# Patient Record
Sex: FEMALE | Race: WHITE | URBAN - METROPOLITAN AREA
[De-identification: names, ages, dates, MRNs, and addresses within clinical notes are randomized per-mention and may not be internally consistent; named-entity substitution may affect disease eponyms.]

---

## 2020-01-10 ENCOUNTER — NEW PATIENT (OUTPATIENT)
Dept: URBAN - METROPOLITAN AREA CLINIC 85 | Facility: CLINIC | Age: 80
End: 2020-01-10
Payer: MEDICARE

## 2020-01-10 DIAGNOSIS — H52.4 PRESBYOPIA: ICD-10-CM

## 2020-01-10 PROCEDURE — 92015 DETERMINE REFRACTIVE STATE: CPT | Performed by: OPTOMETRIST

## 2020-01-10 PROCEDURE — 92004 COMPRE OPH EXAM NEW PT 1/>: CPT | Performed by: OPTOMETRIST

## 2020-01-10 PROCEDURE — 92134 CPTRZ OPH DX IMG PST SGM RTA: CPT | Performed by: OPTOMETRIST

## 2020-01-10 ASSESSMENT — INTRAOCULAR PRESSURE
OS: 14
OD: 14

## 2020-01-10 ASSESSMENT — VISUAL ACUITY
OD: 20/50
OS: 20/30

## 2020-08-01 ENCOUNTER — NEW PATIENT (OUTPATIENT)
Dept: URBAN - METROPOLITAN AREA CLINIC 85 | Facility: CLINIC | Age: 80
End: 2020-08-01
Payer: MEDICARE

## 2020-08-01 DIAGNOSIS — E11.9 TYPE 2 DIABETES MELLITUS WITHOUT COMPLICATIONS: Primary | ICD-10-CM

## 2020-08-01 PROCEDURE — 92134 CPTRZ OPH DX IMG PST SGM RTA: CPT | Performed by: OPHTHALMOLOGY

## 2020-08-01 PROCEDURE — 92004 COMPRE OPH EXAM NEW PT 1/>: CPT | Performed by: OPHTHALMOLOGY

## 2020-08-01 ASSESSMENT — VISUAL ACUITY
OD: 20/40
OS: 20/20

## 2020-08-01 ASSESSMENT — INTRAOCULAR PRESSURE
OD: 14
OS: 14

## 2021-02-26 ENCOUNTER — FOLLOW UP ESTABLISHED (OUTPATIENT)
Dept: URBAN - METROPOLITAN AREA CLINIC 85 | Facility: CLINIC | Age: 81
End: 2021-02-26
Payer: MEDICARE

## 2021-02-26 DIAGNOSIS — H26.491 OTHER SECONDARY CATARACT, RIGHT EYE: ICD-10-CM

## 2021-02-26 DIAGNOSIS — Z79.84 LONG TERM (CURRENT) USE OF ORAL ANTIDIABETIC DRUGS: ICD-10-CM

## 2021-02-26 PROCEDURE — 92014 COMPRE OPH EXAM EST PT 1/>: CPT | Performed by: OPHTHALMOLOGY

## 2021-02-26 PROCEDURE — 92134 CPTRZ OPH DX IMG PST SGM RTA: CPT | Performed by: OPHTHALMOLOGY

## 2021-02-26 ASSESSMENT — INTRAOCULAR PRESSURE
OS: 13
OD: 13

## 2021-02-26 ASSESSMENT — VISUAL ACUITY
OD: 20/30
OS: 20/25

## 2022-02-28 ENCOUNTER — OFFICE VISIT (OUTPATIENT)
Dept: URBAN - METROPOLITAN AREA CLINIC 85 | Facility: CLINIC | Age: 82
End: 2022-02-28
Payer: MEDICARE

## 2022-02-28 DIAGNOSIS — H43.813 VITREOUS DEGENERATION, BILATERAL: ICD-10-CM

## 2022-02-28 DIAGNOSIS — H35.3132 NONEXUDATIVE AGE-RELATED MACULAR DEGENERATION, BILATERAL, INTERMEDIATE DRY STAGE: ICD-10-CM

## 2022-02-28 PROCEDURE — 92134 CPTRZ OPH DX IMG PST SGM RTA: CPT | Performed by: OPHTHALMOLOGY

## 2022-02-28 PROCEDURE — 92014 COMPRE OPH EXAM EST PT 1/>: CPT | Performed by: OPHTHALMOLOGY

## 2022-02-28 ASSESSMENT — VISUAL ACUITY
OD: 20/30
OS: 20/25

## 2022-02-28 ASSESSMENT — INTRAOCULAR PRESSURE
OS: 14
OD: 14

## 2022-02-28 NOTE — IMPRESSION/PLAN
Impression: Other secondary cataract, right eye Plan: The risks and benefits of YAG Capsulotomy were discussed as were post-op restrictions and likelihood of increased floaters postoperatively which may require additional treatment. The patient defers.

## 2022-02-28 NOTE — IMPRESSION/PLAN
Impression: Nonexudative macular degeneration, intermediate dry stage, bilateral Plan: Macular degeneration dry type. Discussed variable rate of progression and potential of conversion to wet type. In the event condition converts to wet form, patient understands need for consult with retinal specialist regarding potential intervention. Discussed increased risk with smoking. Continue AREDS II & Amsler grid to monitor vision daily. Patient was told to call office immediately if patient experiences vision and/or Amsler grid changes.

## 2022-11-11 ENCOUNTER — OFFICE VISIT (OUTPATIENT)
Dept: URBAN - METROPOLITAN AREA CLINIC 85 | Facility: CLINIC | Age: 82
End: 2022-11-11
Payer: MEDICARE

## 2022-11-11 DIAGNOSIS — H11.32 SUBCONJUNCTIVAL HEMORRHAGE OF LEFT EYE: Primary | ICD-10-CM

## 2022-11-11 PROCEDURE — 92012 INTRM OPH EXAM EST PATIENT: CPT | Performed by: OPHTHALMOLOGY

## 2022-11-11 ASSESSMENT — INTRAOCULAR PRESSURE
OS: 14
OD: 15

## 2022-12-09 ENCOUNTER — OFFICE VISIT (OUTPATIENT)
Dept: URBAN - METROPOLITAN AREA CLINIC 85 | Facility: CLINIC | Age: 82
End: 2022-12-09
Payer: MEDICARE

## 2022-12-09 DIAGNOSIS — H26.491 OTHER SECONDARY CATARACT, RIGHT EYE: ICD-10-CM

## 2022-12-09 DIAGNOSIS — H35.3132 NONEXUDATIVE MACULAR DEGENERATION, INTERMEDIATE DRY STAGE, BILATERAL: ICD-10-CM

## 2022-12-09 DIAGNOSIS — E11.9 TYPE 2 DIABETES MELLITUS WITHOUT COMPLICATIONS: Primary | ICD-10-CM

## 2022-12-09 PROCEDURE — 99214 OFFICE O/P EST MOD 30 MIN: CPT | Performed by: OPHTHALMOLOGY

## 2022-12-09 ASSESSMENT — VISUAL ACUITY
OS: 20/30
OD: 20/40

## 2022-12-09 ASSESSMENT — INTRAOCULAR PRESSURE
OS: 13
OD: 14

## 2022-12-09 ASSESSMENT — KERATOMETRY
OD: 42.25
OS: 41.88

## 2022-12-09 NOTE — IMPRESSION/PLAN
Impression: Other secondary cataract, right eye: H26.491. Plan: The risks and benefits of YAG Capsulotomy were discussed as were post-op restrictions and likelihood of increased floaters postoperatively which may require additional treatment. The patient wants to proceed. Other (Free Text): LN2 declined Note Text (......Xxx Chief Complaint.): This diagnosis correlates with the Detail Level: Simple

## 2022-12-09 NOTE — IMPRESSION/PLAN
Impression: Type 2 diabetes mellitus without complications: Y51.1. Plan: No diabetic retinopathy. Recommend yearly diabetic eye exam. Discussed with patient importance of good blood sugar control with regular visits with PCP, compliance with medications, healthy diet and daily exercise.

## 2022-12-09 NOTE — IMPRESSION/PLAN
Impression: Nonexudative macular degeneration, intermediate dry stage, bilateral: H35.7719. Plan: Macular degeneration dry type. Patient was instructed on the use of the Amsler grid and will continue monitoring vision. Discussed variable rate of progression and potential of conversion to wet type. In the event condition converts to wet form, patient understands need for consult with retinal specialist regarding potential intervention. Discussed increased risk with smoking.  recommends taking AREDS II vitamins and antioxidants, provided the patient's primary care doctor approves  (They have been shown to have the potential of slowing the progression of macular degeneration.)  Patient given outline of AREDS II recommendations to give to primary care doctor before taking supplement. Patient given Amsler grid and instructed on proper use to monitor vision daily. Patient was told to call office immediately if patient experiences vision and/or Amsler grid changes.

## 2022-12-29 ENCOUNTER — SURGERY (OUTPATIENT)
Dept: URBAN - METROPOLITAN AREA SURGERY 55 | Facility: SURGERY | Age: 82
End: 2022-12-29
Payer: MEDICARE

## 2022-12-29 PROCEDURE — 66821 AFTER CATARACT LASER SURGERY: CPT | Performed by: OPHTHALMOLOGY

## 2023-12-19 ENCOUNTER — OFFICE VISIT (OUTPATIENT)
Dept: URBAN - METROPOLITAN AREA CLINIC 85 | Facility: CLINIC | Age: 83
End: 2023-12-19
Payer: MEDICARE

## 2023-12-19 DIAGNOSIS — Z79.84 LONG TERM (CURRENT) USE OF ORAL ANTIDIABETIC DRUGS: ICD-10-CM

## 2023-12-19 DIAGNOSIS — H52.4 PRESBYOPIA: ICD-10-CM

## 2023-12-19 DIAGNOSIS — E11.9 TYPE 2 DIABETES MELLITUS WITHOUT COMPLICATIONS: ICD-10-CM

## 2023-12-19 DIAGNOSIS — H43.813 VITREOUS DEGENERATION, BILATERAL: ICD-10-CM

## 2023-12-19 PROCEDURE — 92004 COMPRE OPH EXAM NEW PT 1/>: CPT | Performed by: OPTOMETRIST

## 2023-12-19 PROCEDURE — 92134 CPTRZ OPH DX IMG PST SGM RTA: CPT | Performed by: OPTOMETRIST

## 2023-12-19 ASSESSMENT — VISUAL ACUITY
OS: 20/20
OD: 20/50

## 2023-12-19 ASSESSMENT — INTRAOCULAR PRESSURE
OD: 13
OS: 13

## 2024-01-17 ENCOUNTER — OFFICE VISIT (OUTPATIENT)
Dept: URBAN - METROPOLITAN AREA CLINIC 85 | Facility: CLINIC | Age: 84
End: 2024-01-17
Payer: MEDICARE

## 2024-01-17 DIAGNOSIS — H35.3132 NONEXUDATIVE AGE-RELATED MACULAR DEGENERATION, BILATERAL, INTERMEDIATE DRY STAGE: Primary | ICD-10-CM

## 2024-01-17 PROCEDURE — 99214 OFFICE O/P EST MOD 30 MIN: CPT | Performed by: OPHTHALMOLOGY

## 2024-01-17 PROCEDURE — 92134 CPTRZ OPH DX IMG PST SGM RTA: CPT | Performed by: OPHTHALMOLOGY

## 2024-01-17 ASSESSMENT — INTRAOCULAR PRESSURE
OD: 12
OS: 12

## 2024-03-22 ENCOUNTER — OFFICE VISIT (OUTPATIENT)
Dept: URBAN - METROPOLITAN AREA CLINIC 85 | Facility: CLINIC | Age: 84
End: 2024-03-22
Payer: MEDICARE

## 2024-03-22 DIAGNOSIS — H35.3132 NONEXUDATIVE AGE-RELATED MACULAR DEGENERATION, BILATERAL, INTERMEDIATE DRY STAGE: Primary | ICD-10-CM

## 2024-03-22 PROCEDURE — 92134 CPTRZ OPH DX IMG PST SGM RTA: CPT | Performed by: OPHTHALMOLOGY

## 2024-03-22 PROCEDURE — 99214 OFFICE O/P EST MOD 30 MIN: CPT | Performed by: OPHTHALMOLOGY

## 2024-03-22 ASSESSMENT — INTRAOCULAR PRESSURE
OD: 12
OS: 12

## 2024-07-05 ENCOUNTER — OFFICE VISIT (OUTPATIENT)
Dept: URBAN - METROPOLITAN AREA CLINIC 85 | Facility: CLINIC | Age: 84
End: 2024-07-05
Payer: MEDICARE

## 2024-07-05 DIAGNOSIS — H35.3113 NEXDTVE AGE-REL MCLR DEGN, R EYE, ADV ATRPC W/O SBFVL INVL: Primary | ICD-10-CM

## 2024-07-05 DIAGNOSIS — H35.3122 NEXDTVE AGE-RELATED MCLR DEGN, LEFT EYE, INTERMED DRY STAGE: ICD-10-CM

## 2024-07-05 PROCEDURE — 92134 CPTRZ OPH DX IMG PST SGM RTA: CPT | Performed by: OPHTHALMOLOGY

## 2024-07-05 PROCEDURE — 99214 OFFICE O/P EST MOD 30 MIN: CPT | Performed by: OPHTHALMOLOGY

## 2024-07-05 ASSESSMENT — INTRAOCULAR PRESSURE
OD: 12
OS: 13

## 2024-11-13 ENCOUNTER — OFFICE VISIT (OUTPATIENT)
Dept: URBAN - METROPOLITAN AREA CLINIC 85 | Facility: CLINIC | Age: 84
End: 2024-11-13
Payer: MEDICARE

## 2024-11-13 DIAGNOSIS — E11.9 DIABETES MELLITUS TYPE 2 WITHOUT MENTION OF COMPLICATION: ICD-10-CM

## 2024-11-13 DIAGNOSIS — H35.3114 NEXDTVE AGE-REL MCLR DEGN, R EYE, ADV ATRPC WITH SBFVL INVL: Primary | ICD-10-CM

## 2024-11-13 DIAGNOSIS — H35.3122 NONEXUDATIVE AGE-RELATED MACULAR DEGENERATION, LEFT EYE, INTERMEDIATE DRY STAGE: ICD-10-CM

## 2024-11-13 PROCEDURE — 92134 CPTRZ OPH DX IMG PST SGM RTA: CPT | Performed by: OPHTHALMOLOGY

## 2024-11-13 PROCEDURE — 99213 OFFICE O/P EST LOW 20 MIN: CPT | Performed by: OPHTHALMOLOGY

## 2024-11-13 ASSESSMENT — INTRAOCULAR PRESSURE
OD: 13
OS: 14

## 2025-05-14 ENCOUNTER — OFFICE VISIT (OUTPATIENT)
Dept: URBAN - METROPOLITAN AREA CLINIC 85 | Facility: CLINIC | Age: 85
End: 2025-05-14
Payer: MEDICARE

## 2025-05-14 DIAGNOSIS — H35.3122 NEXDTVE AGE-RELATED MCLR DEGN, LEFT EYE, INTERMED DRY STAGE: ICD-10-CM

## 2025-05-14 DIAGNOSIS — H35.3114 NEXDTVE AGE-REL MCLR DEGN, R EYE, ADV ATRPC WITH SBFVL INVL: Primary | ICD-10-CM

## 2025-05-14 DIAGNOSIS — H04.123 TEAR FILM INSUFFICIENCY OF BILATERAL LACRIMAL GLANDS: ICD-10-CM

## 2025-05-14 PROCEDURE — 99213 OFFICE O/P EST LOW 20 MIN: CPT | Performed by: OPHTHALMOLOGY

## 2025-05-14 PROCEDURE — 92134 CPTRZ OPH DX IMG PST SGM RTA: CPT | Performed by: OPHTHALMOLOGY

## 2025-05-14 ASSESSMENT — INTRAOCULAR PRESSURE
OS: 14
OD: 12